# Patient Record
Sex: FEMALE | Race: OTHER | URBAN - METROPOLITAN AREA
[De-identification: names, ages, dates, MRNs, and addresses within clinical notes are randomized per-mention and may not be internally consistent; named-entity substitution may affect disease eponyms.]

---

## 2023-04-15 ENCOUNTER — HOSPITAL ENCOUNTER (EMERGENCY)
Facility: HOSPITAL | Age: 33
Discharge: HOME | End: 2023-04-15
Attending: EMERGENCY MEDICINE
Payer: COMMERCIAL

## 2023-04-15 VITALS
RESPIRATION RATE: 18 BRPM | HEIGHT: 63 IN | OXYGEN SATURATION: 99 % | BODY MASS INDEX: 35.44 KG/M2 | WEIGHT: 200 LBS | TEMPERATURE: 98 F | HEART RATE: 105 BPM | SYSTOLIC BLOOD PRESSURE: 121 MMHG | DIASTOLIC BLOOD PRESSURE: 68 MMHG

## 2023-04-15 DIAGNOSIS — S82.131A CLOSED FRACTURE OF MEDIAL PORTION OF RIGHT TIBIAL PLATEAU, INITIAL ENCOUNTER: Primary | ICD-10-CM
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
## 2023-04-15 LAB
B-HCG UR QL: NEGATIVE
CTP QC/QA: YES

## 2023-04-15 PROCEDURE — 29505 APPLICATION LONG LEG SPLINT: CPT | Mod: RT

## 2023-04-15 PROCEDURE — 99284 EMERGENCY DEPT VISIT MOD MDM: CPT | Mod: ,,, | Performed by: EMERGENCY MEDICINE

## 2023-04-15 PROCEDURE — 90715 TDAP VACCINE 7 YRS/> IM: CPT | Performed by: STUDENT IN AN ORGANIZED HEALTH CARE EDUCATION/TRAINING PROGRAM

## 2023-04-15 PROCEDURE — 25000003 PHARM REV CODE 250

## 2023-04-15 PROCEDURE — 96372 THER/PROPH/DIAG INJ SC/IM: CPT | Mod: 59 | Performed by: STUDENT IN AN ORGANIZED HEALTH CARE EDUCATION/TRAINING PROGRAM

## 2023-04-15 PROCEDURE — 63600175 PHARM REV CODE 636 W HCPCS: Performed by: STUDENT IN AN ORGANIZED HEALTH CARE EDUCATION/TRAINING PROGRAM

## 2023-04-15 PROCEDURE — 90471 IMMUNIZATION ADMIN: CPT | Performed by: STUDENT IN AN ORGANIZED HEALTH CARE EDUCATION/TRAINING PROGRAM

## 2023-04-15 PROCEDURE — 81025 URINE PREGNANCY TEST: CPT | Performed by: STUDENT IN AN ORGANIZED HEALTH CARE EDUCATION/TRAINING PROGRAM

## 2023-04-15 PROCEDURE — 25000003 PHARM REV CODE 250: Performed by: STUDENT IN AN ORGANIZED HEALTH CARE EDUCATION/TRAINING PROGRAM

## 2023-04-15 PROCEDURE — 99285 EMERGENCY DEPT VISIT HI MDM: CPT | Mod: 25

## 2023-04-15 PROCEDURE — 99284 PR EMERGENCY DEPT VISIT,LEVEL IV: ICD-10-PCS | Mod: ,,, | Performed by: EMERGENCY MEDICINE

## 2023-04-15 RX ORDER — METHOCARBAMOL 750 MG/1
750 TABLET, FILM COATED ORAL EVERY 8 HOURS
Qty: 30 TABLET | Refills: 0 | Status: SHIPPED | OUTPATIENT
Start: 2023-04-15 | End: 2023-04-25

## 2023-04-15 RX ORDER — ACETAMINOPHEN 500 MG
1000 TABLET ORAL
Status: COMPLETED | OUTPATIENT
Start: 2023-04-15 | End: 2023-04-15

## 2023-04-15 RX ORDER — CELECOXIB 100 MG/1
100 CAPSULE ORAL 2 TIMES DAILY PRN
Qty: 14 CAPSULE | Refills: 0 | Status: SHIPPED | OUTPATIENT
Start: 2023-04-15 | End: 2023-04-22

## 2023-04-15 RX ORDER — KETOROLAC TROMETHAMINE 30 MG/ML
10 INJECTION, SOLUTION INTRAMUSCULAR; INTRAVENOUS
Status: COMPLETED | OUTPATIENT
Start: 2023-04-15 | End: 2023-04-15

## 2023-04-15 RX ORDER — ACETAMINOPHEN 500 MG
1000 TABLET ORAL EVERY 8 HOURS
Qty: 42 TABLET | Refills: 0 | Status: SHIPPED | OUTPATIENT
Start: 2023-04-15 | End: 2023-04-22

## 2023-04-15 RX ORDER — ASPIRIN 81 MG/1
81 TABLET ORAL 2 TIMES DAILY
Qty: 28 TABLET | Refills: 0 | Status: SHIPPED | OUTPATIENT
Start: 2023-04-15 | End: 2023-04-29

## 2023-04-15 RX ORDER — OXYCODONE HYDROCHLORIDE 5 MG/1
5 TABLET ORAL EVERY 4 HOURS PRN
Qty: 30 TABLET | Refills: 0 | Status: SHIPPED | OUTPATIENT
Start: 2023-04-15

## 2023-04-15 RX ORDER — OXYCODONE AND ACETAMINOPHEN 5; 325 MG/1; MG/1
1 TABLET ORAL
Status: COMPLETED | OUTPATIENT
Start: 2023-04-15 | End: 2023-04-15

## 2023-04-15 RX ADMIN — ACETAMINOPHEN 1000 MG: 500 TABLET ORAL at 11:04

## 2023-04-15 RX ADMIN — TETANUS TOXOID, REDUCED DIPHTHERIA TOXOID AND ACELLULAR PERTUSSIS VACCINE, ADSORBED 0.5 ML: 5; 2.5; 8; 8; 2.5 SUSPENSION INTRAMUSCULAR at 05:04

## 2023-04-15 RX ADMIN — OXYCODONE HYDROCHLORIDE AND ACETAMINOPHEN 1 TABLET: 5; 325 TABLET ORAL at 05:04

## 2023-04-15 RX ADMIN — KETOROLAC TROMETHAMINE 10 MG: 30 INJECTION, SOLUTION INTRAMUSCULAR; INTRAVENOUS at 11:04

## 2023-04-15 NOTE — SUBJECTIVE & OBJECTIVE
"No past medical history on file.    No past surgical history on file.    Review of patient's allergies indicates:  Not on File    Current Facility-Administered Medications   Medication    Tdap (BOOSTRIX) vaccine injection 0.5 mL     No current outpatient medications on file.     Family History    None       Tobacco Use    Smoking status: Not on file    Smokeless tobacco: Not on file   Substance and Sexual Activity    Alcohol use: Not on file    Drug use: Not on file    Sexual activity: Not on file     ROS  Constitutional: negative for fevers  Eyes: negative visual changes  ENT: negative for hearing loss  Respiratory: negative for dyspnea  Cardiovascular: negative for chest pain  Gastrointestinal: negative for abdominal pain  Genitourinary: negative for dysuria  Neurological: negative for headaches  Behavioral/Psych: negative for hallucinations  Endocrine: negative for temperature intolerance    Objective:     Vital Signs (Most Recent):  Temp: 98.2 °F (36.8 °C) (04/15/23 1055)  Pulse: 80 (04/15/23 1055)  Resp: 18 (04/15/23 1055)  BP: 130/80 (04/15/23 1055)  SpO2: 100 % (04/15/23 1055) Vital Signs (24h Range):  Temp:  [98.2 °F (36.8 °C)] 98.2 °F (36.8 °C)  Pulse:  [80] 80  Resp:  [18] 18  SpO2:  [100 %] 100 %  BP: (130)/(80) 130/80     Weight: 90.7 kg (200 lb)  Height: 5' 3" (160 cm)  Body mass index is 35.43 kg/m².    Ortho/SPM Exam  General:  no acute distress, appears stated age   Neuro: alert and oriented x3  Psych: normal mood  Head: normocephalic, atraumatic.  Eyes: no scleral icterus  Mouth: moist mucous membranes  Cardiovascular: extremities warm and well perfused  Lungs: breathing comfortably, equal chest rise bilat  Skin: clean, dry, intact (any exceptions noted in below musculoskeletal exam)    MSK:  RUE:  - Skin intact throughout, no open wounds  - No swelling  - No ecchymosis, erythema, or signs of cellulitis  - NonTTP throughout  - AROM and PROM of the shoulder, elbow, wrist, and hand intact without " pain  - Axillary/AIN/PIN/Radial/Median/Ulnar Nerves assessed in isolation without deficit  - SILT throughout  - Compartments soft  - 2+ radial pulse  - Capillary Refill <3s    LUE:  - Skin intact throughout, mild abrasions around the shoulder  - No swelling  - No ecchymosis, erythema, or signs of cellulitis  - Mild TTP around the shoulder  - Mild pain with ROM of the shoulder  - AROM and PROM of the elbow, wrist, and hand intact without pain  - Axillary/AIN/PIN/Radial/Median/Ulnar Nerves assessed in isolation without deficit  - SILT throughout  - Compartments soft  - 2+ radial pulse  - Capillary Refill <3s    RLE:  - Skin intact throughout, no open wounds  - Swelling around the knee, mild ecchymosis  - TTP about the knee  - ROM of the knee deferred due to known fracture  - No pain in the leg with ROM of the ankle and toes  - AROM and PROM of the hip, ankle, and foot intact without pain  - TA/EHL/Gastroc/FHL assessed in isolation without deficit  - SILT throughout  - Compartments soft  - 2+ DP and PT  - Capillary Refill <3s  - Negative Log roll    LLE:  - Skin intact throughout, no open wounds  - No swelling  - No ecchymosis, erythema, or signs of cellulitis  - NonTTP throughout  - AROM and PROM of the hip, knee, ankle, and foot intact without pain  - TA/EHL/Gastroc/FHL assessed in isolation without deficit  - SILT throughout  - Compartments soft  - 2+ DP and PT  - Capillary Refill <3s  - Negative Log roll    Spine/pelvis/axial body:  No tenderness to palpation of cervical, thoracic, or lumbar spine  No pain with compression of pelvis      Significant Labs: All pertinent labs within the past 24 hours have been reviewed.    Significant Imaging: I have reviewed and interpreted all pertinent imaging results/findings.  X-ray right knee with a depressed medial tibial plateau fracture  CT right knee with a comminuted right anteromedial tibial plateau fracture  X-ray left shoulder with no acute osseous abnormalities

## 2023-04-15 NOTE — DISCHARGE INSTRUCTIONS
Diagnosis:  Tibial plateau fracture    Please take aspirin daily for the next month.  You may take ibuprofen and Tylenol for pain.  Please take oxycodone for breakthrough pain.  Please follow-up with orthopedics in [place].    A brace is like a removable cast. It helps your broken bone heal by holding it in place. Take good care of your splint. A damaged splint can keep your injury from healing well. If your splint becomes damaged or loses its shape, you may need to replace it.     Home Care Instructions include:  - Elevate (boost) your broken bone to the height of your hip when sitting or lying down to reduce swelling  - Continue taking your home medications as prescribed  - Exercise the nearby joints not kept still by the splint. For example, if you have a long leg splint, exercise your hip joint and your toes. If you have an arm splint, exercise your shoulder, elbow, thumb, and fingers.    Take ibuprofen (also called Advil, Motrin) for your pain. This medicine is available over-the-counter in 200 mg tablets.  - You may take 600 mg every 6 hours, or 800 mg every 8 hours as needed   - Do not take more than this amount, as it can cause kidney problems, bleeding in your stomach, and other serious problems.   - Do not also take naproxen (Aleve) at the same time or on the same day  - If you have heart problems or uncontrolled high blood pressure, you should not take ibuprofen for more than 3 days without discussing with your doctor      Follow-up plan:  - Follow-up with the orthopedic surgeon. Please call for an appointment.  - Follow-up with primary care doctor as needed  - Return to activity as directed by your orthopedic surgeon    Return to the Emergency Department immediately if:   - Splint feels too tight or too loose  - Severe or worsening pain  - Tingling or numbness in the affected area  - Swelling, coldness, paleness, or blue-gray color in the fingers or toes  - Splint is damaged, cracked, or has rough edges  that hurt  - You notice pressure sores or blisters

## 2023-04-15 NOTE — ASSESSMENT & PLAN NOTE
Jarett Hale is a 33 y.o. female with a right medial tibial plateau fracture. She is closed and neurovascularly intact. ORLIN 1.0. No compartment syndrome on exam - pain well controlled, compartments compressible, no pain with passive stretch of the foot and ankle, neurologically intact, 2+ distal pulses. The CT of the knee that was obtained showed that the fracture appears to be length stable. Due to the nature of this fracture, the patient will require operative fixation for stabilization. It was officially recommended that the patient be admitted for application of a knee spanning external fixator, but after discussion of the surgery's risk, benefits, and alternatives and full understanding, she chose to forego surgery and discharge home with a hinged knee brace. She was counseled on instability and compartment syndrome and instructed to return to the ED if she were to begin having those symptoms. She was placed into a hinged knee brace that was locked at 20 degrees of flexion and instructed to be strict nonweightbearing.    Plan:  Nonweightbearing to the right lower extremity  Stay in hinged knee brace locked in 20 degrees of flexion at all times  Multimodal pain control  Aspirin 81 mg BID for DVT prophylaxis  Imaging provided on a disc for her [place] Orth[facility] Surgeon    Dispo: Discharged home. Return precautions given. F/u in [place] for operative fixation.

## 2023-04-15 NOTE — HPI
Jarett Hale is a 33 y.o. female with no significant PMH presenting with right knee and left shoulder pain after she slipped on a curb and fell onto her extended right leg. She had immediate right knee pain and inability to bear weight on the RLE. Patient denies any head trauma or LOC. The patient denies prior history of falls. Patient denies numbness and tingling. Denies any other musculoskeletal pain or injuries. No known history of prior RLE injury or surgery. Doesn't take any anticoagulation at baseline. She currently lives in [place] and was in town visiting friends for vacation. X-rays in the ED showed a right medial tibial plateau fracture.    Orthopedics consulted for evaluation of right knee tibial plateau fracture seen on X-ray. Patient lives outside of [facility] and would like any Orthopedic follow up to happen there if possible.

## 2023-04-15 NOTE — ED PROVIDER NOTES
Encounter Date: 4/15/2023       History     Chief Complaint   Patient presents with    Fall     EMS reports patient tripped and fell at [facility]- right knee pain     Ms. Hale is a previously healthy 33-year-old female who presents to the emergency department due to a fall. Patient states that she was walking at [facility] when she tripped on a curb and fell landing on her right knee she states that when she tried to get up she fell again this time landing on her left arm.  She sustained abrasions to her arm as well as her knee but she was unable to walk without assistance and so she came to the emergency department for evaluation.  She denies any head trauma she denies any loss of consciousness.  She is not on blood thinners.     The history is provided by the patient.   Review of patient's allergies indicates:  Not on File  Past Medical History:   Diagnosis Date    Right medial tibial plateau fracture 4/15/2023     No past surgical history on file.  No family history on file.     Review of Systems   Constitutional:  Negative for chills, diaphoresis, fatigue and fever.   HENT:  Negative for congestion, rhinorrhea and sore throat.    Eyes:  Negative for visual disturbance.   Respiratory:  Negative for cough, chest tightness and shortness of breath.    Cardiovascular:  Negative for chest pain.   Gastrointestinal:  Negative for abdominal pain, blood in stool, constipation, diarrhea and vomiting.   Genitourinary:  Negative for dysuria, hematuria and urgency.   Musculoskeletal:  Positive for gait problem and myalgias. Negative for back pain.   Skin:  Negative for rash.   Neurological:  Negative for seizures and syncope.   Hematological:  Does not bruise/bleed easily.   Psychiatric/Behavioral:  Negative for agitation and hallucinations.      Physical Exam     Initial Vitals [04/15/23 1055]   BP Pulse Resp Temp SpO2   130/80 80 18 98.2 °F (36.8 °C) 100 %      MAP       --         Physical Exam     Nursing note and vitals  reviewed.  Constitutional: Patient appears well-developed and well-nourished. No distress. AxOx3, NAD, well nourished, appears stated age  HENT:   Head: Normocephalic and atraumatic.   Eyes: Conjunctivae and EOM are normal. Pupils are equal, round, and reactive to light. no scleral icterus, no periorbital edema or ecchymosis  Neck: Neck supple. no stridor, no masses, no drooling or voice changes  Normal range of motion.  Cardiovascular: Normal rate, regular rhythm, normal heart sounds and intact distal pulses. no m/r/g  Pulmonary/Chest: Breath sounds normal. CTAB, no wheezes, rales or rhonchi, no increased work of breathing  Abdominal: Abdomen is soft. Patient exhibits no distension. There is no abdominal tenderness. no organomegaly, no CVAT  Musculoskeletal:      Cervical back: Normal range of motion and neck supple.   Pain on palpation of right knee, decreased range of motion of right knee.  Patient unable to ambulate  Pain on palpation of left forearm  Neurological: Patient is alert and oriented to person, place, and time. No cranial nerve deficit.  GCS score is 15. Moving all extremities, , face grossly symmetric  Skin: Skin is warm and dry.  Ext: no edema, no lesions, rashes, or deformity  Psych: Normal mood/affect,cooperative, well groomed, makes good eye contact      ED Course   Procedures  Labs Reviewed   POCT URINE PREGNANCY          Imaging Results              CT Knee Without Contrast Right (Final result)  Result time 04/15/23 14:41:58      Final result by Cholo Hamm MD (04/15/23 14:41:58)                   Impression:      1. Medial tibial plateau fracture as above.      Electronically signed by: Cholo Hamm MD  Date:    04/15/2023  Time:    14:41               Narrative:    EXAMINATION:  CT KNEE WITHOUT CONTRAST RIGHT; CT 3D WITHOUT INDEPENDENT WS    CLINICAL HISTORY:  Fracture, knee;; Fracture, knee;fracture;    TECHNIQUE:  Axial images of the right knee were obtained at 0.625 mm intervals  without administration of IV contrast.  Coronal and sagittal reformatted images were reviewed.  3D reconstructed images were created on a separate workstation and reviewed.    COMPARISON:  Radiograph 04/15/2023    FINDINGS:  There is depressed comminuted fracture involving the medial tibial plateau fracture planes extend to the posterior tibial spine as well as to the anterior surface..  Several fracture fragments are noted about the fracture planes.  There is an associated lipohemarthrosis.  No dislocation.  The patella is intact.  There is edema about the fracture site.                                       CT 3D RECON WITHOUT INDEPENDENT WS (Final result)  Result time 04/15/23 14:41:58      Final result by Cholo Hamm MD (04/15/23 14:41:58)                   Impression:      1. Medial tibial plateau fracture as above.      Electronically signed by: Cholo Hamm MD  Date:    04/15/2023  Time:    14:41               Narrative:    EXAMINATION:  CT KNEE WITHOUT CONTRAST RIGHT; CT 3D WITHOUT INDEPENDENT WS    CLINICAL HISTORY:  Fracture, knee;; Fracture, knee;fracture;    TECHNIQUE:  Axial images of the right knee were obtained at 0.625 mm intervals without administration of IV contrast.  Coronal and sagittal reformatted images were reviewed.  3D reconstructed images were created on a separate workstation and reviewed.    COMPARISON:  Radiograph 04/15/2023    FINDINGS:  There is depressed comminuted fracture involving the medial tibial plateau fracture planes extend to the posterior tibial spine as well as to the anterior surface..  Several fracture fragments are noted about the fracture planes.  There is an associated lipohemarthrosis.  No dislocation.  The patella is intact.  There is edema about the fracture site.                                       X-Ray Knee 1 or 2 View Right (Final result)  Result time 04/15/23 12:19:02   Procedure changed from X-Ray Knee 3 View Right     Final result by Cholo SMITH  MD Hansel (04/15/23 12:19:02)                   Impression:      1. Findings concerning for depressed fracture of the medial tibial plateau.  Correlation is advised.      Electronically signed by: Cholo Hamm MD  Date:    04/15/2023  Time:    12:19               Narrative:    EXAMINATION:  XR KNEE 1 OR 2 VIEW RIGHT; XR TIBIA FIBULA 2 VIEW RIGHT    CLINICAL HISTORY:  fall;  Unspecified fall, initial encounter    TECHNIQUE:  AP and lateral views of the right knee were performed.  Three views right tibia fibula.    COMPARISON:  None    FINDINGS:  Three views right knee.  Three views right tibia fibula.    There is cortical irregularity involving the medial tibial plateau.  There is a Lipo hemarthrosis.  The distal aspect of the femur is intact.  The patella is intact.  No dislocation.  The remaining aspects of the tibia and fibula are intact.  The ankle mortise is intact.                                       X-Ray Humerus 2 View Left (Final result)  Result time 04/15/23 12:17:33      Final result by Cholo Hamm MD (04/15/23 12:17:33)                   Impression:      1. No acute displaced fracture or dislocation of the left humerus.      Electronically signed by: Cholo Hamm MD  Date:    04/15/2023  Time:    12:17               Narrative:    EXAMINATION:  XR HUMERUS 2 VIEW LEFT    CLINICAL HISTORY:  Unspecified fall, initial encounter    COMPARISON:  None    FINDINGS:  Two views left humerus.    The left humeral head maintains appropriate relationship with the glenoid.  The acromioclavicular joint is intact.  No acute displaced left rib fracture.  The elbow is intact.                                       X-Ray Tibia Fibula 2 View Right (Final result)  Result time 04/15/23 12:19:02      Final result by Cholo Hamm MD (04/15/23 12:19:02)                   Impression:      1. Findings concerning for depressed fracture of the medial tibial plateau.  Correlation is advised.      Electronically  signed by: Cholo Hamm MD  Date:    04/15/2023  Time:    12:19               Narrative:    EXAMINATION:  XR KNEE 1 OR 2 VIEW RIGHT; XR TIBIA FIBULA 2 VIEW RIGHT    CLINICAL HISTORY:  fall;  Unspecified fall, initial encounter    TECHNIQUE:  AP and lateral views of the right knee were performed.  Three views right tibia fibula.    COMPARISON:  None    FINDINGS:  Three views right knee.  Three views right tibia fibula.    There is cortical irregularity involving the medial tibial plateau.  There is a Lipo hemarthrosis.  The distal aspect of the femur is intact.  The patella is intact.  No dislocation.  The remaining aspects of the tibia and fibula are intact.  The ankle mortise is intact.                                       X-Ray Shoulder Trauma Left (Final result)  Result time 04/15/23 12:16:52      Final result by Cholo Hamm MD (04/15/23 12:16:52)                   Impression:      1. No acute displaced fracture or dislocation of the left shoulder.      Electronically signed by: Cholo Hamm MD  Date:    04/15/2023  Time:    12:16               Narrative:    EXAMINATION:  XR SHOULDER TRAUMA 3 VIEW LEFT    CLINICAL HISTORY:  Unspecified fall, initial encounter    TECHNIQUE:  Three views of the left shoulder were performed.    COMPARISON  None    FINDINGS:  Four views left shoulder.    The left humeral head maintains appropriate relationship with the glenoid.  The acromioclavicular joint is intact.  No acute displaced left rib fracture.  The left lung zones are clear.                                       Medications   Tdap (BOOSTRIX) vaccine injection 0.5 mL (0.5 mLs Intramuscular Given 4/15/23 1750)   acetaminophen tablet 1,000 mg (1,000 mg Oral Given 4/15/23 1131)   ketorolac injection 9.999 mg (9.999 mg Intramuscular Given 4/15/23 1131)   oxyCODONE-acetaminophen 5-325 mg per tablet 1 tablet (1 tablet Oral Given 4/15/23 1750)     Medical Decision Making:   Initial Assessment:   Ms. Hale is a  previously healthy 33-year-old female who presents emergency department due to a fall.   Differential Diagnosis:   Humerus fracture  Knee Fracture  Tendon/ligament injury  Muscle sprain  Independently Interpreted Test(s):   I have ordered and independently interpreted X-rays - see prior notes.  I have ordered and independently interpreted EKG Reading(s) - see prior notes  Clinical Tests:   Lab Tests: Ordered and Reviewed  Radiological Study: Ordered and Reviewed  ED Management:  Patient was thoroughly examined,   She was found to have a tibial plateau fracture and so discussion was had orthopedic surgery who wanted a CT scan  Patient was given pain medicine as well as tetanus vaccine for her abrasions  Patient has had out to incoming team pending her CT scan.           Attending Attestation:   Physician Attestation Statement for Resident:  As the supervising MD   Physician Attestation Statement: I have personally seen and examined this patient.   I agree with the above history.  -:   As the supervising MD I agree with the above PE.   -: NAD, NCAT, A&Ox3, PERRL and EOMI, neck supple/normal ROM, CTAB, RRR no mrg, R knee mildly swollen and TTP over prox tibia with painful ROM but no deformity or ecchymosis, LUE with superficial abrasion and TTP but normal ROM, abd s/nt/nd, no LE edema, skin dry and warm     As the supervising MD I agree with the above treatment, course, plan, and disposition.    I have reviewed and agree with the residents interpretation of the following: x-rays.  I have reviewed the following: old records at this facility.                           Clinical Impression:   Final diagnoses:  [W19.XXXA] Fall        ED Disposition Condition    Discharge Stable          ED Prescriptions       Medication Sig Dispense Start Date End Date Auth. Provider    aspirin (ECOTRIN) 81 MG EC tablet Take 1 tablet (81 mg total) by mouth 2 (two) times a day. for 14 days 28 tablet 4/15/2023 4/29/2023 Alex Horta MD     oxyCODONE (ROXICODONE) 5 MG immediate release tablet Take 1 tablet (5 mg total) by mouth every 4 (four) hours as needed for Pain. 30 tablet 4/15/2023 -- Alex Horta MD    methocarbamoL (ROBAXIN) 750 MG Tab Take 1 tablet (750 mg total) by mouth every 8 (eight) hours. For muscle spasms. for 10 days 30 tablet 4/15/2023 4/25/2023 Alex Horta MD    acetaminophen (TYLENOL) 500 MG tablet Take 2 tablets (1,000 mg total) by mouth every 8 (eight) hours. for 7 days 42 tablet 4/15/2023 4/22/2023 Alex Horta MD    celecoxib (CELEBREX) 100 MG capsule Take 1 capsule (100 mg total) by mouth 2 (two) times daily as needed for Pain. Take with food. 14 capsule 4/15/2023 4/22/2023 Alex Horta MD          Follow-up Information    None          Maya Smith MD  Resident  04/16/23 [id number]       Carlota Abrams MD  04/16/23 1006

## 2023-04-15 NOTE — CONSULTS
[facility] - Emergency Dept  Orthopedics  Consult Note    Patient Name: Jarett Hale  MRN: [id number]  Admission Date: 4/15/2023  Hospital Length of Stay: 0 days  Attending Provider: No att. providers found  Primary Care Provider: No primary care provider on file.    Patient information was obtained from patient and ER records.     Inpatient consult to Orthopedic Surgery  Consult performed by: Alex Horta MD  Consult ordered by: Maya Smith MD        Subjective:     Principal Problem:Right medial tibial plateau fracture    Chief Complaint:   Chief Complaint   Patient presents with    Fall     EMS reports patient tripped and fell at [facility]- right knee pain        HPI: Jarett Hale is a 33 y.o. female with no significant PMH presenting with right knee and left shoulder pain after she slipped on a curb and fell onto her extended right leg. She had immediate right knee pain and inability to bear weight on the RLE. Patient denies any head trauma or LOC. The patient denies prior history of falls. Patient denies numbness and tingling. Denies any other musculoskeletal pain or injuries. No known history of prior RLE injury or surgery. Doesn't take any anticoagulation at baseline. She currently lives in [place] and was in town visiting friends for vacation. X-rays in the ED showed a right medial tibial plateau fracture.    Orthopedics consulted for evaluation of right knee tibial plateau fracture seen on X-ray. Patient lives outside of [facility] and would like any Orthopedic follow up to happen there if possible.      No past medical history on file.    No past surgical history on file.    Review of patient's allergies indicates:  Not on File    Current Facility-Administered Medications   Medication    Tdap (BOOSTRIX) vaccine injection 0.5 mL     No current outpatient medications on file.     Family History    None       Tobacco Use    Smoking status: Not on file    Smokeless tobacco: Not on  "file   Substance and Sexual Activity    Alcohol use: Not on file    Drug use: Not on file    Sexual activity: Not on file     ROS  Constitutional: negative for fevers  Eyes: negative visual changes  ENT: negative for hearing loss  Respiratory: negative for dyspnea  Cardiovascular: negative for chest pain  Gastrointestinal: negative for abdominal pain  Genitourinary: negative for dysuria  Neurological: negative for headaches  Behavioral/Psych: negative for hallucinations  Endocrine: negative for temperature intolerance    Objective:     Vital Signs (Most Recent):  Temp: 98.2 °F (36.8 °C) (04/15/23 1055)  Pulse: 80 (04/15/23 1055)  Resp: 18 (04/15/23 1055)  BP: 130/80 (04/15/23 1055)  SpO2: 100 % (04/15/23 1055) Vital Signs (24h Range):  Temp:  [98.2 °F (36.8 °C)] 98.2 °F (36.8 °C)  Pulse:  [80] 80  Resp:  [18] 18  SpO2:  [100 %] 100 %  BP: (130)/(80) 130/80     Weight: 90.7 kg (200 lb)  Height: 5' 3" (160 cm)  Body mass index is 35.43 kg/m².    Ortho/SPM Exam  General:  no acute distress, appears stated age   Neuro: alert and oriented x3  Psych: normal mood  Head: normocephalic, atraumatic.  Eyes: no scleral icterus  Mouth: moist mucous membranes  Cardiovascular: extremities warm and well perfused  Lungs: breathing comfortably, equal chest rise bilat  Skin: clean, dry, intact (any exceptions noted in below musculoskeletal exam)    MSK:  RUE:  - Skin intact throughout, no open wounds  - No swelling  - No ecchymosis, erythema, or signs of cellulitis  - NonTTP throughout  - AROM and PROM of the shoulder, elbow, wrist, and hand intact without pain  - Axillary/AIN/PIN/Radial/Median/Ulnar Nerves assessed in isolation without deficit  - SILT throughout  - Compartments soft  - 2+ radial pulse  - Capillary Refill <3s    LUE:  - Skin intact throughout, mild abrasions around the shoulder  - No swelling  - No ecchymosis, erythema, or signs of cellulitis  - Mild TTP around the shoulder  - Mild pain with ROM of the shoulder  - " AROM and PROM of the elbow, wrist, and hand intact without pain  - Axillary/AIN/PIN/Radial/Median/Ulnar Nerves assessed in isolation without deficit  - SILT throughout  - Compartments soft  - 2+ radial pulse  - Capillary Refill <3s    RLE:  - Skin intact throughout, no open wounds  - Swelling around the knee, mild ecchymosis  - TTP about the knee  - ROM of the knee deferred due to known fracture  - No pain in the leg with ROM of the ankle and toes  - AROM and PROM of the hip, ankle, and foot intact without pain  - TA/EHL/Gastroc/FHL assessed in isolation without deficit  - SILT throughout  - Compartments soft  - 2+ DP and PT  - Capillary Refill <3s  - Negative Log roll    LLE:  - Skin intact throughout, no open wounds  - No swelling  - No ecchymosis, erythema, or signs of cellulitis  - NonTTP throughout  - AROM and PROM of the hip, knee, ankle, and foot intact without pain  - TA/EHL/Gastroc/FHL assessed in isolation without deficit  - SILT throughout  - Compartments soft  - 2+ DP and PT  - Capillary Refill <3s  - Negative Log roll    Spine/pelvis/axial body:  No tenderness to palpation of cervical, thoracic, or lumbar spine  No pain with compression of pelvis      Significant Labs: All pertinent labs within the past 24 hours have been reviewed.    Significant Imaging: I have reviewed and interpreted all pertinent imaging results/findings.  X-ray right knee with a depressed medial tibial plateau fracture  CT right knee with a comminuted right anteromedial tibial plateau fracture  X-ray left shoulder with no acute osseous abnormalities    Assessment/Plan:     * Right medial tibial plateau fracture  Jarett Hale is a 33 y.o. female with a right medial tibial plateau fracture. She is closed and neurovascularly intact. ORLIN 1.0. No compartment syndrome on exam - pain well controlled, compartments compressible, no pain with passive stretch of the foot and ankle, neurologically intact, 2+ distal pulses. The CT  of the knee that was obtained showed that the fracture appears to be length stable. Due to the nature of this fracture, the patient will require operative fixation for stabilization. It was officially recommended that the patient be admitted for application of a knee spanning external fixator, but after discussion of the surgery's risk, benefits, and alternatives and full understanding, she chose to forego surgery and discharge home with a hinged knee brace. She was counseled on instability and compartment syndrome and instructed to return to the ED if she were to begin having those symptoms. She was placed into a hinged knee brace that was locked at 20 degrees of flexion and instructed to be strict nonweightbearing.    Plan:  Nonweightbearing to the right lower extremity  Stay in hinged knee brace locked in 20 degrees of flexion at all times  Multimodal pain control  Aspirin 81 mg BID for DVT prophylaxis  Imaging provided on a disc for her [place] [facility]    Dispo: Discharged home. Return precautions given. F/u in [place] for operative fixation.      Alex Horta MD  Orthopedics  [facility] - [facility] Dept

## 2023-04-15 NOTE — Clinical Note
"Jarett Kulkarni" Alexia was seen and treated in our emergency department on 4/15/2023.  She may return to work on 04/19/2023.  Patient will require accommodations such as working from home for orthopedic injury.  Patient may progressively return to normal activity at the discretion of her orthopedic doctors.     If you have any questions or concerns, please don't hesitate to call.      Casandra Arshad MD"

## 2023-04-15 NOTE — PROVIDER PROGRESS NOTES - EMERGENCY DEPT.
"Encounter Date: 4/15/2023    ED Physician Progress Notes            ED Resident HAND-OFF NOTE:  Jarett Hale is a 33 y.o. female who presented to the ED on 4/15/2023, patient C/O fall. I assumed care of patient from off-going ED physician team patient pending CT of knee, orthopedic recommendations.    In brief, patient tripped out homework and sustained a tibial plateau fracture.  Orthopedics would like to obtain CT imaging before determine disposition.    On my evaluation, Jarett Hale appears well, hemodynamically stable and in NAD. Thus far, Jarett Hale has received:  Medications   Tdap (BOOSTRIX) vaccine injection 0.5 mL (0.5 mLs Intramuscular Given 4/15/23 1750)   acetaminophen tablet 1,000 mg (1,000 mg Oral Given 4/15/23 1131)   ketorolac injection 9.999 mg (9.999 mg Intramuscular Given 4/15/23 1131)   oxyCODONE-acetaminophen 5-325 mg per tablet 1 tablet (1 tablet Oral Given 4/15/23 1750)       /68   Pulse 105   Temp 98.2 °F (36.8 °C) (Oral)   Resp 18   Ht 5' 3" (1.6 m)   Wt 90.7 kg (200 lb)   SpO2 99%   BMI 35.43 kg/m²         Disposition: I anticipate patient will depend on ortho recs  ______________________  Casandra Arshad MD   Emergency Medicine Resident      UPDATE:       Reassessed patient, states that if she does not move, 1/10 pain.  Does not require any additional dosing of pain medications at this time.      CT demonstrates depressed comminuted fracture involving the medial tibial plateau fracture planes extend to the posterior tibial spine as well as to the anterior surface.      Discussed patient with orthopedic surgery, they recommend discharge with aspirin/narcotics.  Will obtain CD of patient's imaging so that she may follow-up with orthopedics in [place].    Discussed strict return precautions, patient voices understanding.  Discussed use of brace and pain medications.  Prescriptions provided by Orthopedics.  Patient is hemodynamically stable " and appropriate for discharge at this time.  Provided work note so that she may receive appropriate accommodations for her injury.      :  Fall

## 2023-04-15 NOTE — Clinical Note
"Jarett Kulkarni" Alexia was seen and treated in our emergency department on 4/15/2023.  She may return to work on 04/19/2023.  Patient will require accommodations for orthopedic injury.  Patient may progressively return to normal activity at the discretion of her orthopedic doctors.     If you have any questions or concerns, please don't hesitate to call.      Casandra Arshad MD"